# Patient Record
(demographics unavailable — no encounter records)

---

## 2025-03-31 NOTE — ADDENDUM
[FreeTextEntry1] : I, Yulissa Elias, acted solely as a scribe for Dr. Jose Rojas MD on this date 03/31/2025 .   All medical record entries made by the Scribe were at my, Dr. Jose Rojas MD., direction and personally dictated by me on 03/06/2024. I have reviewed the chart and agree that the record accurately reflects my personal performance of the history, physical exam, assessment and plan. I have also personally directed, reviewed, and agreed with the chart.

## 2025-03-31 NOTE — HISTORY OF PRESENT ILLNESS
[de-identified] : Patient is a 69 year old female who presents today for evaluation of chronic low back pain. This has been occurring for 4 years . She reports she has lumbar spinal stenosis and osteoarthritis of bilateral hips. The pain is exacerbated with prolonged walking and performance of ADLs. She reports she takes Oxycodone Acetaminophen for pain control. She reports she has [facility] She has tried physical therapy for at least 6 weeks with no symptomatic improvement. She reports of an MRI Lumbar Spine from 2023. She denies bowel/bladder incontinence. She denies saddle anesthesia. Of note, she reports a Hx of kidney disease.

## 2025-03-31 NOTE — PHYSICAL EXAM
[de-identified] : Lumbar Physical Exam Gait - Normal Station - Normal Sagittal Balance - Normal Compensatory Mechanism? - None  Heel walk - Normal Toe walk - unable to toe walk  Reflexes  Patellar - Normal Gastroc - Normal Clonus - No  Hip Exam - Normal Straight leg raise - None  Pulses - 2+ DP/PT Range of motion - Normal   Sensation Sensation intact to light touch in L1, L2, L3, L4, L5, and S1 dermatomes bilaterally Motor IP Quad HS TA Gastroc EHL Right 3+/5 5/5 5/5 5/5 5/5 5/5 5/5 Left 3+/5 5/5 5/5 5/5 5/5 5/5 5/5  [de-identified] : Lumbar Radiographs degenerative scoliosis facet arthropathy no obvious issues in terms of height or morphology severe left hip osteoarthritis  L5 spondylolisthesis  MRI Lumbar Spine (2023) L4-L5 severe spinal stenosis L3-L4 moderate spinal stenosis

## 2025-03-31 NOTE — ASSESSMENT
[FreeTextEntry1] : I had a lengthy discussion with the patient in regards to the treatment plan and diagnosis. The patient does have objective weakness findings on my exam. Furthermore I am concerned in regards to compression along the patient's spinal cord and/or nerve roots. As a result I would like to proceed, I would like to proceed with an MRI of the patient's lumbar spine. In tandem with this the patient is encouraged to walk as much as possible. I will have the patient follow-up in 3-4 weeks for repeat clinical evaluation. I encouraged the patient to reach out to me directly at any point if their symptoms worsen or change in any way.  The patient has tried the following treatments: Activity modification + Ice/Compression + Braces  - NSAIDS  + Physical Therapy +  Please note the above modalities have been tried for 6+ weeks over the past 2 months.

## 2025-04-21 NOTE — HISTORY OF PRESENT ILLNESS
[de-identified] : Patient is a 69 year old female who presents today for evaluation of chronic low back pain. She reports that she has right LE pain. She has been taking Percocet prescribed by Dr. Abbott. She states that she has been taking care of her grandchild. She reports that she has anemia.   03.31.25 Patient is a 69 year old female who presents today for evaluation of chronic low back pain. This has been occurring for 4 years . She reports she has lumbar spinal stenosis and osteoarthritis of bilateral hips. The pain is exacerbated with prolonged walking and performance of ADLs. She reports she takes Oxycodone Acetaminophen for pain control. She reports she has been [facility]. She has tried physical therapy for at least 6 weeks with no symptomatic improvement. She reports of an MRI Lumbar Spine from 2023. She denies bowel/bladder incontinence. She denies saddle anesthesia. Of note, she reports a Hx of kidney disease.

## 2025-04-21 NOTE — ASSESSMENT
[FreeTextEntry1] :  I had a long discussion with the patient in regards to her treatment plan and diagnosis. She does have lumbar radiculopathy, lumbar neurogenic claudication from severe L3-L5 spinal stenosis and L3-L5 Spondylolisthesis. In my opinion her MRI findings do match her clinical exam. She has tried and failed significant conservative management including therapy, analgesics etc. Unfortunately their symptoms have persisted. Given her lack of improvement with conservative management, the fact that her imaging findings match her clinical exam I do think that she is an appropriate surgical candidate. I have proposed a lateral interbody fusion L3-L4, L4-L5 and L3-L5 posterior lateral fusion/decompression. Risks/benefits were discussed. I did go over the surgical plan using models and I did describe the postoperative recovery. She will follow up once she has made a decision. All questions were answered today.

## 2025-04-21 NOTE — ADDENDUM
[FreeTextEntry1] :  I, Tiara Brower, acted solely as a scribe for Dr. Jose Rojas MD on this date 04/21/2025    All medical record entries made by the Scribe were at my, Dr. Jose Rojas MD., direction and personally dictated by me on 04/21/2025 . I have reviewed the chart and agree that the record accurately reflects my personal performance of the history, physical exam, assessment and plan. I have also personally directed, reviewed, and agreed with the chart.

## 2025-06-04 NOTE — ASSESSMENT
[Vaccines Reviewed] : Immunizations reviewed today. Please see immunization details in the vaccine log within the immunization flowsheet.  [FreeTextEntry1] : #HCM Labs reviewed on HIE Iron studies, TSH, renal panel today  Preventative Care- Cervical cancer Screen- pap 05/2025- Normal Breast Cancer Screen- Mammogram due for this year DEXA- pending. Referral given by J[facility].  Colon Cancer Screen- colonoscopy 5 years ago- as per patient test was normal.  Immunizations- Covid- x3 Influenza- UTD Tdap-UTD Pneumococcal-UTD Shingles- UTD  The plan of care was discussed with the patient in full detail. She verbalized understanding and in agreement with the plan of care.  RTC for [facility].

## 2025-06-04 NOTE — PHYSICAL EXAM
[Normal Sclera/Conjunctiva] : normal sclera/conjunctiva [PERRL] : pupils equal round and reactive to light [EOMI] : extraocular movements intact [Normal] : the outer ears and nose were normal in appearance and the oropharynx was normal [Supple] : supple [No Respiratory Distress] : no respiratory distress  [No Accessory Muscle Use] : no accessory muscle use [Clear to Auscultation] : lungs were clear to auscultation bilaterally [Normal Rate] : normal rate  [Regular Rhythm] : with a regular rhythm [Pedal Pulses Present] : the pedal pulses are present [No Edema] : there was no peripheral edema [Soft] : abdomen soft [Non Tender] : non-tender [Non-distended] : non-distended [Normal Supraclavicular Nodes] : no supraclavicular lymphadenopathy [Normal Posterior Cervical Nodes] : no posterior cervical lymphadenopathy [No CVA Tenderness] : no CVA  tenderness [No Spinal Tenderness] : no spinal tenderness [No Joint Swelling] : no joint swelling [Normal Affect] : the affect was normal [Alert and Oriented x3] : oriented to person, place, and time [Normal Mood] : the mood was normal [de-identified] : generalized dark pigmented spots

## 2025-06-04 NOTE — HEALTH RISK ASSESSMENT
[Good] : ~his/her~  mood as  good [No] : No [One fall no injury in past year] : Patient reported one fall in the past year without injury [0] : 2) Feeling down, depressed, or hopeless: Not at all (0) [PHQ-2 Negative - No further assessment needed] : PHQ-2 Negative - No further assessment needed [With Family] : lives with family [Retired] : retired [Single] : single [Former] : Former [HIV test declined] : HIV test declined [Hepatitis C test declined] : Hepatitis C test declined [Audit-CScore] : 0 [[id number]] : 0 [Reports changes in hearing] : Reports no changes in hearing [Reports changes in vision] : Reports no changes in vision [Reports changes in dental health] : Reports no changes in dental health [de-identified] : son [de-identified] : needs assistance [de-identified] : needs assistance [de-identified] : quit in 1974

## 2025-06-04 NOTE — HISTORY OF PRESENT ILLNESS
[FreeTextEntry1] : Establish Care. 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  Patient with back pain and scheduled for L3-L4 lateral lumbar interbody fusion and spinal fusion and decompression scheduled for 6/12/2025 at [facility].   #CKD- Following private nephrology Dr. Orourke # [phone] with [facility].   # Hypothyroidism- Following Endocrine -Dr. Deidra Lopez,  [facility] Currently taking Levothyroxine 75mcg daily  # Hiatal hernia- Following GI- Dr. Marge Lopez.# [phone]. [address] [facility].   # Chronic back pain- Following Pain management- Dr. Hill at [facility]. currently taking oxycodone 5mg TID as needed.    #DSAP- Disseminated superficial actinic porokeratosis. following dermatology. s/p biopsy. pending to start light therapy.   Preventative Care- Cervical cancer Screen- pap 05/2025- Normal Breast Cancer Screen- Mammogram due for this year DEXA- pending. Referral given by Endo.  Colon Cancer Screen- colonoscopy 5 years ago- as per patient test was normal.  Immunizations- Covid- x3 Influenza- UTD Tdap-UTD Pneumococcal-UTD Shingles- UTD

## 2025-06-04 NOTE — ASSESSMENT
[Vaccines Reviewed] : Immunizations reviewed today. Please see immunization details in the vaccine log within the immunization flowsheet.  [FreeTextEntry1] : #HCM Labs reviewed on HIE Iron studies, TSH, renal panel today  Preventative Care- Cervical cancer Screen- pap 05/2025- Normal Breast Cancer Screen- Mammogram due for this year DEXA- pending. Referral given by J[facility].  Colon Cancer Screen- colonoscopy 5 years ago- as per patient test was normal.  Immunizations- Covid- x3 Influenza- UTD Tdap-UTD Pneumococcal-UTD Shingles- UTD  The plan of care was discussed with the patient in full detail. She verbalized understanding and in agreement with the plan of care.  RTC for [facility].

## 2025-06-04 NOTE — PHYSICAL EXAM
[Normal Sclera/Conjunctiva] : normal sclera/conjunctiva [PERRL] : pupils equal round and reactive to light [EOMI] : extraocular movements intact [Normal] : the outer ears and nose were normal in appearance and the oropharynx was normal [Supple] : supple [No Respiratory Distress] : no respiratory distress  [No Accessory Muscle Use] : no accessory muscle use [Clear to Auscultation] : lungs were clear to auscultation bilaterally [Normal Rate] : normal rate  [Regular Rhythm] : with a regular rhythm [Pedal Pulses Present] : the pedal pulses are present [No Edema] : there was no peripheral edema [Soft] : abdomen soft [Non Tender] : non-tender [Non-distended] : non-distended [Normal Supraclavicular Nodes] : no supraclavicular lymphadenopathy [Normal Posterior Cervical Nodes] : no posterior cervical lymphadenopathy [No CVA Tenderness] : no CVA  tenderness [No Spinal Tenderness] : no spinal tenderness [No Joint Swelling] : no joint swelling [Normal Affect] : the affect was normal [Alert and Oriented x3] : oriented to person, place, and time [Normal Mood] : the mood was normal [de-identified] : generalized dark pigmented spots

## 2025-06-04 NOTE — HISTORY OF PRESENT ILLNESS
[FreeTextEntry1] : Establish Care. 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  Patient with back pain and scheduled for L3-L4 lateral lumbar interbody fusion and spinal fusion and decompression scheduled for 6/12/2025 at [facility].   #CKD- Following private nephrology Dr. Ororuke # [phone] with [facility].   # Hypothyroidism- Following Endocrine -Dr. Deidra Lopez,  [facility] Currently taking Levothyroxine 75mcg daily  # Hiatal hernia- Following GI- Dr. Marge Lopez.# [phone]. [address] [facility].   # Chronic back pain- Following Pain management- Dr. Hill at [facility]. currently taking oxycodone 5mg TID as needed.    #DSAP- Disseminated superficial actinic porokeratosis. following dermatology. s/p biopsy. pending to start light therapy.   Preventative Care- Cervical cancer Screen- pap 05/2025- Normal Breast Cancer Screen- Mammogram due for this year DEXA- pending. Referral given by Endo.  Colon Cancer Screen- colonoscopy 5 years ago- as per patient test was normal.  Immunizations- Covid- x3 Influenza- UTD Tdap-UTD Pneumococcal-UTD Shingles- UTD

## 2025-06-04 NOTE — HEALTH RISK ASSESSMENT
[Good] : ~his/her~  mood as  good [No] : No [One fall no injury in past year] : Patient reported one fall in the past year without injury [0] : 2) Feeling down, depressed, or hopeless: Not at all (0) [PHQ-2 Negative - No further assessment needed] : PHQ-2 Negative - No further assessment needed [With Family] : lives with family [Retired] : retired [Single] : single [Former] : Former [HIV test declined] : HIV test declined [Hepatitis C test declined] : Hepatitis C test declined [Audit-CScore] : 0 [[id number]] : 0 [Reports changes in hearing] : Reports no changes in hearing [Reports changes in vision] : Reports no changes in vision [Reports changes in dental health] : Reports no changes in dental health [de-identified] : son [de-identified] : needs assistance [de-identified] : needs assistance [de-identified] : quit in 1974

## 2025-07-02 NOTE — HISTORY OF PRESENT ILLNESS
[de-identified] : Patient is a 69 year old female who presents today for evaluation of chronic low back pain. She reports that she has right LE pain. She states that she has been taking care of her grandchild. She reports that she has anemia.

## 2025-07-02 NOTE — PHYSICAL EXAM
[de-identified] : Lumbar Physical Exam Gait - Normal Station - Normal Sagittal Balance - Normal Compensatory Mechanism? - None Heel walk - Normal Toe walk - unable to toe walk  Reflexes Patellar - Normal Gastroc - Normal Clonus - No  Hip Exam - Normal Straight leg raise - None Pulses - 2+ DP/PT Range of motion - Normal  Sensation Sensation intact to light touch in L1, L2, L3, L4, L5, and S1 dermatomes bilaterally Motor IP Quad HS TA Gastroc EHL Right 5/5 5/5 5/5 5/5 5/5 5/5 5/5 Left 5/5 5/5 5/5 5/5 5/5 5/5 5/5 [de-identified] : Previous Lumbar MRI severe L4-L5 spinal stenosis L3-L5 Spondylolisthesis  previous imaging: Lumbar Radiographs degenerative scoliosis facet arthropathy no obvious issues in terms of height or morphology severe left hip osteoarthritis L5 spondylolisthesis  MRI Lumbar Spine (2023) L4-L5 severe spinal stenosis L3-L4 moderate spinal stenosis.

## 2025-07-02 NOTE — PHYSICAL EXAM
[de-identified] : Lumbar Physical Exam Gait - Normal Station - Normal Sagittal Balance - Normal Compensatory Mechanism? - None Heel walk - Normal Toe walk - unable to toe walk  Reflexes Patellar - Normal Gastroc - Normal Clonus - No  Hip Exam - Normal Straight leg raise - None Pulses - 2+ DP/PT Range of motion - Normal  Sensation Sensation intact to light touch in L1, L2, L3, L4, L5, and S1 dermatomes bilaterally Motor IP Quad HS TA Gastroc EHL Right 5/5 5/5 5/5 5/5 5/5 5/5 5/5 Left 5/5 5/5 5/5 5/5 5/5 5/5 5/5 [de-identified] : Previous Lumbar MRI severe L4-L5 spinal stenosis L3-L5 Spondylolisthesis  previous imaging: Lumbar Radiographs degenerative scoliosis facet arthropathy no obvious issues in terms of height or morphology severe left hip osteoarthritis L5 spondylolisthesis  MRI Lumbar Spine (2023) L4-L5 severe spinal stenosis L3-L4 moderate spinal stenosis.

## 2025-07-02 NOTE — ASSESSMENT
[FreeTextEntry1] : This is a 70-year-old female here today for evaluation and treatment of her low back and leg symptoms.  She does have signs and symptoms concerning for lumbar radiculopathy, lumbar neurogenic claudication in the setting of severe spinal stenosis at L3-L4 and L4-L5.  She does have spondylolisthesis at both these levels as well.  We will have her set up for an L3-L4, L4-L5 lateral lumbar interbody fusion.  This will be set up for later this month.  Please note the patient does have objective weakness findings.  She has tried and failed substantial conservative management for 6+ weeks over the past 2+ months.  These modalities of conservative management have included physical therapy, epidural steroid injections, activity modifications, NSAIDs, Tylenol.  Unfortunately symptoms have persisted.  As result we will proceed with a lumbar MRI and lumbar CT scan for full evaluation.  This will also help with preoperative planning.  We did discuss risk and benefits of surgery at this point.  Will get this set up for late June.  Thankfully her TSH has come down to a normal level as well.

## 2025-07-02 NOTE — ADDENDUM
[FreeTextEntry1] :  I, Harpreet Meyer, acted solely as a scribe for Dr. Jose Rojas MD on this date 07/02/2025   All medical record entries made by the Scribe were at my, Dr. Jose Rojas MD., direction and personally dictated by me on 07/02/2025 . I have reviewed the chart and agree that the record accurately reflects my personal performance of the history, physical exam, assessment and plan. I have also personally directed, reviewed, and agreed with the chart.

## 2025-07-02 NOTE — HISTORY OF PRESENT ILLNESS
[de-identified] : Patient is a 69 year old female who presents today for evaluation of chronic low back pain. She reports that she has right LE pain. She states that she has been taking care of her grandchild. She reports that she has anemia.

## 2025-07-18 NOTE — PHYSICAL EXAM
[TextEntry] : Gen: NAD, breathing comfortably HEENT: MMM, anicteric sclera Neck: JVP <10 cm. No carotid bruits Cardiac: RRR, no m/r/g Lungs: CTAB, adequate inspiratory effort. No wheezes or rhonchi Abd: soft, NT/ND. No abd bruit Ext: warm and well perfused. No significant LE edema Neuro: moves all extremities Psych: grossly normal affect

## 2025-07-18 NOTE — HISTORY OF PRESENT ILLNESS
[FreeTextEntry1] : CARDIOLOGY INITIAL VISIT   Dear Dr. KATHERIN MENDOZA   I had the pleasure of seeing Ms. ANN-MARIE BANKS in cardiology clinic today for preoperative cardiovascular evaluation prior to lumbar surgery for spinal stenosis   HPI As you are aware, ANN-MARIE BANKS is a 70 year female with a history of HTN, hypothyroidism, CKD and self-reported history of mitral valve prolapse

## 2025-07-19 NOTE — PHYSICAL EXAM
[Normal Sclera/Conjunctiva] : normal sclera/conjunctiva [PERRL] : pupils equal round and reactive to light [EOMI] : extraocular movements intact [Normal] : the outer ears and nose were normal in appearance and the oropharynx was normal [Supple] : supple [No Respiratory Distress] : no respiratory distress  [No Accessory Muscle Use] : no accessory muscle use [Clear to Auscultation] : lungs were clear to auscultation bilaterally [Normal Rate] : normal rate  [Regular Rhythm] : with a regular rhythm [Pedal Pulses Present] : the pedal pulses are present [No Edema] : there was no peripheral edema [Soft] : abdomen soft [Non Tender] : non-tender [Non-distended] : non-distended [Normal Supraclavicular Nodes] : no supraclavicular lymphadenopathy [Normal Posterior Cervical Nodes] : no posterior cervical lymphadenopathy [No CVA Tenderness] : no CVA  tenderness [No Spinal Tenderness] : no spinal tenderness [No Joint Swelling] : no joint swelling [Normal Affect] : the affect was normal [Alert and Oriented x3] : oriented to person, place, and time [Normal Mood] : the mood was normal [de-identified] : generalized dark pigmented spots

## 2025-07-19 NOTE — RESULTS/DATA
[] : results reviewed [de-identified] : h/h stable- 9.1/29.1 Anemia of chronic diseases [de-identified] : cr- 1.68 stable

## 2025-07-19 NOTE — PHYSICAL EXAM
[Normal Sclera/Conjunctiva] : normal sclera/conjunctiva [PERRL] : pupils equal round and reactive to light [EOMI] : extraocular movements intact [Normal] : the outer ears and nose were normal in appearance and the oropharynx was normal [Supple] : supple [No Respiratory Distress] : no respiratory distress  [No Accessory Muscle Use] : no accessory muscle use [Clear to Auscultation] : lungs were clear to auscultation bilaterally [Normal Rate] : normal rate  [Regular Rhythm] : with a regular rhythm [Pedal Pulses Present] : the pedal pulses are present [No Edema] : there was no peripheral edema [Soft] : abdomen soft [Non Tender] : non-tender [Non-distended] : non-distended [Normal Supraclavicular Nodes] : no supraclavicular lymphadenopathy [Normal Posterior Cervical Nodes] : no posterior cervical lymphadenopathy [No CVA Tenderness] : no CVA  tenderness [No Spinal Tenderness] : no spinal tenderness [No Joint Swelling] : no joint swelling [Normal Affect] : the affect was normal [Alert and Oriented x3] : oriented to person, place, and time [Normal Mood] : the mood was normal [de-identified] : generalized dark pigmented spots

## 2025-07-19 NOTE — HISTORY OF PRESENT ILLNESS
[de-identified] : Patient is a 69 year old female who presents today for evaluation of chronic low back pain. She still has right LE pain. She reports that she has difficulty sleeping due to her pain. She is scheduled for a new lumbar MRI next Monday. She is here for a preop appointment.   07.02.25 Patient is a 69 year old female who presents today for evaluation of chronic low back pain. She reports that she has right LE pain. She states that she has been taking care of her grandchild. She reports that she has anemia.

## 2025-07-19 NOTE — ADDENDUM
[FreeTextEntry1] : As per cardiology, there is no cardiac contraindication to proceeding with the surgery planned.  Patient is medically optimized for the spinal surgery.

## 2025-07-19 NOTE — HISTORY OF PRESENT ILLNESS
[No Adverse Anesthesia Reaction] : no adverse anesthesia reaction in self or family member [Chronic Kidney Disease] : chronic kidney disease [Moderate (4-6 METs)] : Moderate (4-6 METs) [(Patient denies any chest pain, claudication, dyspnea on exertion, orthopnea, palpitations or syncope)] : Patient denies any chest pain, claudication, dyspnea on exertion, orthopnea, palpitations or syncope [Chronic Anticoagulation] : no chronic anticoagulation [Diabetes] : no diabetes [FreeTextEntry1] : L3-L4 lateral lumbar fusion and decompression  [FreeTextEntry2] : 07/24/2025 [FreeTextEntry3] : Dr. 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  Patient with chronic back pain. Following Dr. Rojas and also pain management. She had failed conservative treatment, PT and failed the trail of epidural. Pain is limiting her ADLs and also her quality of life. She decided to proceed with the surgery.   The patient reports that she has h/o MVP. Not following cardiology. seen cardiology long time ago had TTE done. unable to provide records. Today she denies chest pain, palpitations, denies SOB, dizziness, denies syncopal events.  will obtain cardiology eval.

## 2025-07-19 NOTE — HISTORY OF PRESENT ILLNESS
[de-identified] : Patient is a 69 year old female who presents today for evaluation of chronic low back pain. She still has right LE pain. She reports that she has difficulty sleeping due to her pain. She is scheduled for a new lumbar MRI next Monday. She is here for a preop appointment.   07.02.25 Patient is a 69 year old female who presents today for evaluation of chronic low back pain. She reports that she has right LE pain. She states that she has been taking care of her grandchild. She reports that she has anemia.

## 2025-07-19 NOTE — PHYSICAL EXAM
[de-identified] : Lumbar Physical Exam Gait - Normal Station - Normal Sagittal Balance - Normal Compensatory Mechanism? - None Heel walk - Normal Toe walk - unable to toe walk  Reflexes Patellar - Normal Gastroc - Normal Clonus - No  Hip Exam - Normal Straight leg raise - None Pulses - 2+ DP/PT Range of motion - Normal  Sensation Sensation intact to light touch in L1, L2, L3, L4, L5, and S1 dermatomes bilaterally Motor IP Quad HS TA Gastroc EHL Right 5/5 5/5 5/5 5/5 5/5 5/5 5/5 Left 5/5 5/5 5/5 5/5 5/5 5/5 5/5 [de-identified] : previous imaging: Previous Lumbar MRI severe L4-L5 spinal stenosis L3-L5 Spondylolisthesis  Lumbar Radiographs degenerative scoliosis facet arthropathy no obvious issues in terms of height or morphology severe left hip osteoarthritis L5 spondylolisthesis  MRI Lumbar Spine (2023) L4-L5 severe spinal stenosis L3-L4 moderate spinal stenosis.

## 2025-07-19 NOTE — HISTORY OF PRESENT ILLNESS
[No Adverse Anesthesia Reaction] : no adverse anesthesia reaction in self or family member [Chronic Kidney Disease] : chronic kidney disease [Moderate (4-6 METs)] : Moderate (4-6 METs) [(Patient denies any chest pain, claudication, dyspnea on exertion, orthopnea, palpitations or syncope)] : Patient denies any chest pain, claudication, dyspnea on exertion, orthopnea, palpitations or syncope [Chronic Anticoagulation] : no chronic anticoagulation [Diabetes] : no diabetes [FreeTextEntry1] : L3-L4 lateral lumbar fusion and decompression  [FreeTextEntry2] : 07/24/2025 [FreeTextEntry3] : Dr. 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  Patient with chronic back pain. Following Dr. Rojas and also pain management. She had failed conservative treatment, PT and failed the trail of epidural. Pain is limiting her ADLs and also her quality of life. She decided to proceed with the surgery.   The patient reports that she has h/o MVP. Not following cardiology. seen cardiology long time ago had TTE done. unable to provide records. Today she denies chest pain, palpitations, denies SOB, dizziness, denies syncopal events.  will obtain cardiology eval.

## 2025-07-19 NOTE — ASSESSMENT
[FreeTextEntry1] : This is a 70-year-old female here today for evaluation and treatment of her low back and leg symptoms.  She does have signs and symptoms concerning for lumbar radiculopathy, lumbar neurogenic claudication in the setting of severe spinal stenosis at L3-L4 and L4-L5.  She does have spondylolisthesis at both these levels as well.  We will have her set up for an L3-L4, L4-L5 lateral lumbar interbody fusion, L3-L5 posterior spinal fusion, and a possible L3-L5 decompression. We will get this set up for 07/24/2025. I did have a lengthy discussion with the patient in regards to risks of the procedure. These risks that include pain, need for reoperation, non resolution of symptoms, injury center nerves arteries and veins, CSF leak, dural tear, meralgia paresthetica, iliopsoas weakness, nerve root injury, weakness, infection, DVT, PE, and hip flexion weakness. I discussed the risks of using bone morphogenetic protein including abnormal bone formation and cancer. Proper informed consent was obtained. I did go over the surgical plan using models and I did describe the postoperative recovery. All questions were answered today.

## 2025-07-19 NOTE — ASSESSMENT
[High Risk Surgery - Intraperitoneal, Intrathoracic or Supringuinal Vascular Procedures] : High Risk Surgery - Intraperitoneal, Intrathoracic or Supringuinal Vascular Procedures - Yes (1) [Ischemic Heart Disease] : Ischemic Heart Disease - No (0) [Congestive Heart Failure] : Congestive Heart Failure - No (0) [Prior Cerebrovascular Accident or TIA] : Prior Cerebrovascular Accident or TIA - No (0) [Creatinine >= 2mg/dL (1 Point)] : Creatinine >= 2mg/dL - No (0) [Insulin-dependent Diabetic (1 Point)] : Insulin-dependent Diabetic - No (0) [1] : 1 , RCRI Class: II, Risk of Post-Op Cardiac Complications: 6.0%, 95% CI for Risk Estimate: 4.9% - 7.4% [Patient Requires Further Testing] : Patient requires further testing [Cardiology consultation] : Cardiology consultation [As per surgery] : as per surgery [Patient Optimized for Surgery] : Patient optimized for surgery

## 2025-07-19 NOTE — RESULTS/DATA
[] : results reviewed [de-identified] : h/h stable- 9.1/29.1 Anemia of chronic diseases [de-identified] : cr- 1.68 stable

## 2025-07-19 NOTE — PHYSICAL EXAM
[de-identified] : Lumbar Physical Exam Gait - Normal Station - Normal Sagittal Balance - Normal Compensatory Mechanism? - None Heel walk - Normal Toe walk - unable to toe walk  Reflexes Patellar - Normal Gastroc - Normal Clonus - No  Hip Exam - Normal Straight leg raise - None Pulses - 2+ DP/PT Range of motion - Normal  Sensation Sensation intact to light touch in L1, L2, L3, L4, L5, and S1 dermatomes bilaterally Motor IP Quad HS TA Gastroc EHL Right 5/5 5/5 5/5 5/5 5/5 5/5 5/5 Left 5/5 5/5 5/5 5/5 5/5 5/5 5/5 [de-identified] : previous imaging: Previous Lumbar MRI severe L4-L5 spinal stenosis L3-L5 Spondylolisthesis  Lumbar Radiographs degenerative scoliosis facet arthropathy no obvious issues in terms of height or morphology severe left hip osteoarthritis L5 spondylolisthesis  MRI Lumbar Spine (2023) L4-L5 severe spinal stenosis L3-L4 moderate spinal stenosis.

## 2025-07-19 NOTE — ADDENDUM
[FreeTextEntry1] :  I, Tiara Brower, acted solely as a scribe for Dr. Jose Rojas MD on this date 07/18/2025    All medical record entries made by the Scribe were at my, Dr. Jose Rojas MD., direction and personally dictated by me on 07/18/2025 . I have reviewed the chart and agree that the record accurately reflects my personal performance of the history, physical exam, assessment and plan. I have also personally directed, reviewed, and agreed with the chart.